# Patient Record
Sex: MALE | Race: BLACK OR AFRICAN AMERICAN | Employment: OTHER | URBAN - METROPOLITAN AREA
[De-identification: names, ages, dates, MRNs, and addresses within clinical notes are randomized per-mention and may not be internally consistent; named-entity substitution may affect disease eponyms.]

---

## 2020-04-26 ENCOUNTER — APPOINTMENT (OUTPATIENT)
Dept: GENERAL RADIOLOGY | Facility: CLINIC | Age: 40
End: 2020-04-26
Attending: EMERGENCY MEDICINE

## 2020-04-26 ENCOUNTER — HOSPITAL ENCOUNTER (EMERGENCY)
Facility: CLINIC | Age: 40
Discharge: HOME | End: 2020-04-26
Attending: EMERGENCY MEDICINE | Admitting: EMERGENCY MEDICINE

## 2020-04-26 VITALS
OXYGEN SATURATION: 98 % | RESPIRATION RATE: 18 BRPM | HEART RATE: 92 BPM | DIASTOLIC BLOOD PRESSURE: 97 MMHG | WEIGHT: 240.74 LBS | TEMPERATURE: 98.8 F | SYSTOLIC BLOOD PRESSURE: 153 MMHG

## 2020-04-26 DIAGNOSIS — M10.021 ACUTE IDIOPATHIC GOUT OF RIGHT ELBOW: Primary | ICD-10-CM

## 2020-04-26 DIAGNOSIS — M25.521 RIGHT ELBOW PAIN: ICD-10-CM

## 2020-04-26 PROCEDURE — 73070 X-RAY EXAM OF ELBOW: CPT | Mod: RT

## 2020-04-26 PROCEDURE — 25000131 ZZH RX MED GY IP 250 OP 636 PS 637: Performed by: EMERGENCY MEDICINE

## 2020-04-26 PROCEDURE — 99283 EMERGENCY DEPT VISIT LOW MDM: CPT

## 2020-04-26 PROCEDURE — 25000132 ZZH RX MED GY IP 250 OP 250 PS 637: Performed by: EMERGENCY MEDICINE

## 2020-04-26 RX ORDER — OLANZAPINE 10 MG/2ML
INJECTION, POWDER, FOR SOLUTION INTRAMUSCULAR
Status: DISCONTINUED
Start: 2020-04-26 | End: 2020-04-26 | Stop reason: HOSPADM

## 2020-04-26 RX ORDER — PREDNISONE 20 MG/1
TABLET ORAL
Qty: 10 TABLET | Refills: 0 | Status: SHIPPED | OUTPATIENT
Start: 2020-04-26 | End: 2020-06-12

## 2020-04-26 RX ORDER — PREDNISONE 20 MG/1
60 TABLET ORAL ONCE
Status: COMPLETED | OUTPATIENT
Start: 2020-04-26 | End: 2020-04-26

## 2020-04-26 RX ORDER — OXYCODONE AND ACETAMINOPHEN 5; 325 MG/1; MG/1
1-2 TABLET ORAL EVERY 4 HOURS PRN
Qty: 12 TABLET | Refills: 0 | Status: SHIPPED | OUTPATIENT
Start: 2020-04-26 | End: 2020-06-12

## 2020-04-26 RX ORDER — COLCHICINE 0.6 MG/1
0.6 TABLET ORAL 2 TIMES DAILY
Qty: 14 TABLET | Refills: 0 | Status: SHIPPED | OUTPATIENT
Start: 2020-04-26 | End: 2020-04-26

## 2020-04-26 RX ORDER — COLCHICINE 0.6 MG/1
1.2 TABLET ORAL ONCE
Status: DISCONTINUED | OUTPATIENT
Start: 2020-04-26 | End: 2020-04-26

## 2020-04-26 RX ORDER — IBUPROFEN 600 MG/1
600 TABLET, FILM COATED ORAL EVERY 6 HOURS PRN
Qty: 60 TABLET | Refills: 0 | Status: SHIPPED | OUTPATIENT
Start: 2020-04-26 | End: 2020-06-12

## 2020-04-26 RX ORDER — IBUPROFEN 600 MG/1
600 TABLET, FILM COATED ORAL ONCE
Status: COMPLETED | OUTPATIENT
Start: 2020-04-26 | End: 2020-04-26

## 2020-04-26 RX ADMIN — PREDNISONE 60 MG: 20 TABLET ORAL at 22:33

## 2020-04-26 RX ADMIN — IBUPROFEN 600 MG: 600 TABLET ORAL at 23:03

## 2020-04-26 ASSESSMENT — ENCOUNTER SYMPTOMS: FEVER: 0

## 2020-04-26 NOTE — ED AVS SNAPSHOT
[facility] Emergency Department  [address]  [facility] [address]  Phone:  [phone]  Fax:  [phone]                                    Tigist Pena   MRN: [id number]    Department:  [facility] Emergency Department   Date of Visit:  4/26/2020           After Visit Summary Signature Page    I have received my discharge instructions, and my questions have been answered. I have discussed any challenges I see with this plan with the nurse or doctor.    ..........................................................................................................................................  Patient/Patient Representative Signature      ..........................................................................................................................................  Patient Representative Print Name and Relationship to Patient    ..................................................               ................................................  Date                                   Time    ..........................................................................................................................................  Reviewed by Signature/Title    ...................................................              ..............................................  Date                                               Time          22EPIC Rev 08/18

## 2020-04-27 NOTE — DISCHARGE INSTRUCTIONS

## 2020-04-27 NOTE — ED PROVIDER NOTES
History   Chief Complaint:  Elbow Pain     HPI   Tigist Pena is a 39 year old male with history of gout who presents with right elbow pain. The patient reports 2 days of right sided elbow pain.  He denies fever or significant swelling to right elbow. He can flex and extend arm but is painful. He has a history of gout, and in the past has been treated with Colchicine, prednisone, and ibuprofen with good relief. He states that he recently moved from [place] where his gout was followed by Rheumatology, but he has not established care here. 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 He is worried about cost today as he does not have insurance.     Allergies:  No Known Drug Allergies    Medications:    Medications reviewed. No current medications.     Past Medical History:    Gout     Past Surgical History:    Right elbow surgery related to tophi    Family History:    Family history reviewed. No pertinent family history.     Social History:  Recently moved from [facility]    Review of Systems   Constitutional: Negative for fever.   Musculoskeletal:        Right elbow pain. No swelling.    All other systems reviewed and are negative.    Physical Exam     Patient Vitals for the past 24 hrs:   BP Temp Temp src Pulse Resp SpO2 Weight   04/26/20 2157 (!) 153/97 98.8  F (37.1  C) Oral 92 18 98 % 109.2 kg (240 lb 11.9 oz)       Physical Exam   General: Resting comfortably on the gurney  Head:  The scalp, face, and head appear normal  Eyes:  The pupils are normal    Conjunctivae and sclera appear normal  ENT:    The nose is normal    Ears/pinnae are normal  Neck:  Normal range of motion  CV:  RRR  Resp:  Clear to ascultation bilaterally.   Skin:  No rash or lesions noted.  Neuro: Speech is normal and fluent  Psych:  Awake. Alert.  Normal affect.      Appropriate interactions  MS:  Right Elbow:    Right arm held in flexion at 90 degrees at elbow.    The humerus is non-tender    The  olecranon is tender    The olecranon bursa is tender.     The lateral and medial supracondylar regions are non-tender    There is no obvious clinical effusion    There is pain with Pronation and Supination    There is pain with Flexion and Extension    The radial head and neck are non-tender    The proximal ulna is non-tender and there is no step off    Distal Hand Exam:    The finger flexors (FDS/FDP) are intact    The finger extensors are intact    The thumb exam is normal, including:    Adduction, abduction, flexion, extension, opposition    There are no sensory deficits    Median, Ulnar, and Radial nerve function is normal    Radial artery pulsations are normal    Capillary refill is normal    Emergency Department Course   Imaging:  Radiology findings were communicated with the patient who voiced understanding of the findings.    XR Elbow Right 2 Views  IMPRESSION: Normal joint spaces and alignment. No fracture or joint effusion.  Reading per radiology     Interventions:  2233 Prednisone 60 mg Oral  2303 Ibuprofen 600 mg oral    Emergency Department Course:  Nursing notes and vitals reviewed.    2205 I performed an exam of the patient as documented above.     The patient was sent for a XR Elbow Right while in the emergency department, results above.      2304 I rechecked the patient. Explained findings to the Patient.    Findings and plan explained to the Patient. Patient discharged home with instructions regarding supportive care, medications, and reasons to return. The importance of close follow-up was reviewed. The patient was prescribed Ibuprofen, Percocet, and Prednisone.      Impression & Plan    Medical Decision Making:  Tigist Pena is a 39 year old male who presents for evaluation of right elbow pain. There is minimal swelling.  This is consistent based on history with acute gouty attack, and Xray of the elbow demonstrates no acute fracture or dislocation.  Based on their history, elected to start  prednisone and ibuprofen for this acute gouty flare.  I doubt at this point that this is a septic arthritis, fracture, pseudogout, cellulitis, or other worrisome etiology.  Supportive outpatient management indicated, and the patient was given a prescription for a short course of percocet and ibuprofen.  Should see ortho in next 1-2 days for recheck and consideration of intra-articular joint injection if no improved symptoms with systemic steroids. Gout information given for home.  Referral to PCP and [facility] (orthopedic surgery)      Diagnosis:    ICD-10-CM    1. Acute idiopathic gout of right elbow  M10.021    2. Right elbow pain  M25.521        Disposition:   discharged to home    Discharge Medications:  Discharge Medication List as of 4/26/2020 11:14 PM      START taking these medications    Details   ibuprofen (ADVIL/MOTRIN) 600 MG tablet Take 1 tablet (600 mg) by mouth every 6 hours as needed for moderate pain or fever, Disp-60 tablet,R-0, Local Print      oxyCODONE-acetaminophen (PERCOCET) 5-325 MG tablet Take 1-2 tablets by mouth every 4 hours as needed for pain, Disp-12 tablet,R-0, Local Print      predniSONE (DELTASONE) 20 MG tablet Take two tablets (= 40mg) each day for 5 (five) days, Disp-10 tablet,R-0, Local Print             Scribe Disclosure:  I, Ashley Kathya, am serving as a scribe at 10:02 PM on 4/26/2020 to document services personally performed by Luis Antonio Blair MD based on my observations and the provider's statements to me.   [facility] EMERGENCY DEPARTMENT       Luis Antonio Blair MD  04/26/20 [id number]

## 2020-04-27 NOTE — ED TRIAGE NOTES
Patient presents with complaints of right elbow pain which started two days ago. Patient does have history of gout.. ABC intact without need for intervention at this time.

## 2020-05-05 ENCOUNTER — VIRTUAL VISIT (OUTPATIENT)
Dept: URGENT CARE | Facility: CLINIC | Age: 40
End: 2020-05-05

## 2020-05-05 DIAGNOSIS — M10.9 ACUTE GOUTY ARTHRITIS: Primary | ICD-10-CM

## 2020-05-05 PROCEDURE — 99207 ZZC NO BILLABLE SERVICE THIS VISIT: CPT | Mod: TEL | Performed by: STUDENT IN AN ORGANIZED HEALTH CARE EDUCATION/TRAINING PROGRAM

## 2020-05-05 RX ORDER — OMEPRAZOLE 40 MG/1
40 CAPSULE, DELAYED RELEASE ORAL DAILY
Qty: 30 CAPSULE | Refills: 1 | Status: SHIPPED | OUTPATIENT
Start: 2020-05-05 | End: 2020-06-12

## 2020-05-05 RX ORDER — PREDNISONE 10 MG/1
TABLET ORAL
Qty: 41 TABLET | Refills: 0 | Status: SHIPPED | OUTPATIENT
Start: 2020-05-05 | End: 2020-06-12

## 2020-05-05 RX ORDER — OXYCODONE HYDROCHLORIDE 5 MG/1
5 TABLET ORAL EVERY 6 HOURS PRN
Qty: 6 TABLET | Refills: 0 | Status: SHIPPED | OUTPATIENT
Start: 2020-05-05 | End: 2020-06-12

## 2020-05-05 RX ORDER — INDOMETHACIN 25 MG/1
25-50 CAPSULE ORAL 2 TIMES DAILY WITH MEALS
Qty: 30 CAPSULE | Refills: 0 | Status: SHIPPED | OUTPATIENT
Start: 2020-05-05 | End: 2020-06-12

## 2020-05-05 NOTE — PROGRESS NOTES
"Family Medicine Telephone Visit Note           Telephone Visit Consent   Patient was verbally read the following and verbal consent was obtained.    \"Telephone visits are billed at different rates depending on your insurance coverage. During this emergency period, for some insurers they may be billed the same as an in-person visit.  Please reach out to your insurance provider with any questions.  If during the course of the call the physician/provider feels a telephone visit is not appropriate, you will not be charged for this service.\"    Name person giving consent:  Patient   Date verbal consent given:  5/5/2020  Time verbal consent given:  9:07 AM      Chief Complaint   Patient presents with     Arthritis            HPI   Patients name: Tigist  Appointment start time:  9:07 AM    Tigist is a 38 yo male with a history of severe recurrent gout who is calling for another gout flare. He was seen in the ED on 4/26 for gout flare of his right elbow.  When in the ED it was noted elbow was minimally swollen and x-rays at that time were normal.  He was treated with a 5-day course of 40 mg of prednisone, ibuprofen, colchicine and a short course of Percocet.  Since the ED his elbow has not improved, and now he is having swelling in his ankle as well.  He was advised to follow-up with [facility] for therapeutic joint aspiration.  He finished his course of prednisone, and has been taking 800 mg of ibuprofen total daily.  He has not used the colchicine as this upsets his stomach.  He reports that he tried the Percocet but prefers oxycodone alone as it does not include the Tylenol.  He reports that he has had multiple gouty flares for the past 9 years, his last 2 years ago.  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  He has not been on allopurinol for preventative measures in between flares, but does note needing IV medications for pain control during " previous flares.  He was followed by rheumatology previously when he lived in [place] but recently moved up here so has not established with a rheumatologist or primary care doctor here.  He did try calling rheumatology at [place], and was offered an appointment at the end of May, but declined at this appointment as it was too far out for him.  He is looking into getting scheduled with rheumatology at [facility] instead.  He also tried calling [facility] and reports that they were hesitant to aspirate his knee for fear of infection at this time.      Current Outpatient Medications   Medication Sig Dispense Refill     ibuprofen (ADVIL/MOTRIN) 600 MG tablet Take 1 tablet (600 mg) by mouth every 6 hours as needed for moderate pain or fever 60 tablet 0     indomethacin (INDOCIN) 25 MG capsule Take 1-2 capsules (25-50 mg) by mouth 2 times daily (with meals) 30 capsule 0     omeprazole (PRILOSEC) 40 MG DR capsule Take 1 capsule (40 mg) by mouth daily 30 capsule 1     oxyCODONE-acetaminophen (PERCOCET) 5-325 MG tablet Take 1-2 tablets by mouth every 4 hours as needed for pain 12 tablet 0     predniSONE (DELTASONE) 10 MG tablet Take 6 tablets (60 mg) by mouth daily for 3 days, THEN 4 tablets (40 mg) daily for 3 days, THEN 2 tablets (20 mg) daily for 3 days, THEN 1 tablet (10 mg) daily for 3 days, THEN 0.5 tablets (5 mg) daily for 3 days. 41 tablet 0     predniSONE (DELTASONE) 20 MG tablet Take two tablets (= 40mg) each day for 5 (five) days 10 tablet 0     No Known Allergies           Review of Systems:     ROS COMP: Constitutional, HEENT, cardiovascular, pulmonary, gi and gu systems are negative, except as otherwise noted.         Physical Exam:     There were no vitals taken for this visit.  There is no height or weight on file to calculate BMI.    Exam:  Constitutional: healthy and alert  Psychiatric: mentation appears normal and frustrated    Results from last visit:  No results found for any previous visit.            Assessment and Plan   Tigist was seen today for arthritis.    Diagnoses and all orders for this visit:    Acute gouty arthritis  Patient has a long history of recurrent severe gout, but has not been on preventative treatments before.  He was frustrated with his care and requested urgent fluid removal from his swollen elbow and ankle.  The phone number to schedule with [facility] was provided to him as well as the phone number to establish with a primary care doctor in [place] as well.  He did become frustrated with his pain management when on the phone with him today.   was checked and did reveal the Percocet prescription from the ED, however we were unable to see any prescriptions from [place] due to [place] not being included in the .  We do not have records from his doctors in [place] and thus it is difficult to make a complete assessment and plan from this limited information.  I feel he should establish with a primary care physician for further assessment and management of his pain with coordination of rheumatology and orthopedic referrals.  In the meantime I did provide him with a prolonged prednisone course,  Indomethacin, and a short course of oxycodone, 6 tabs.  I also sent for omeprazole for gastric protection while he is on NSAIDs and steroids.  Advised to follow-up with a new primary doctor, as well as rheumatology when able.  -     indomethacin (INDOCIN) 25 MG capsule; Take 1-2 capsules (25-50 mg) by mouth 2 times daily (with meals)  -     predniSONE (DELTASONE) 10 MG tablet; Take 6 tablets (60 mg) by mouth daily for 3 days, THEN 4 tablets (40 mg) daily for 3 days, THEN 2 tablets (20 mg) daily for 3 days, THEN 1 tablet (10 mg) daily for 3 days, THEN 0.5 tablets (5 mg) daily for 3 days.  -     omeprazole (PRILOSEC) 40 MG DR capsule; Take 1 capsule (40 mg) by mouth daily  -     ORTHOPEDICS ADULT REFERRAL      Refilled medications that would be required in the next 3 months.     After Visit  Information:  Patient declined AVS     No follow-ups on file.    Appointment end time: 10:00 AM  This is a telephone visit that took 53 minutes.      Clinician location:  Virtual Urgent [facility]    Kayode Pryor MD  I precepted today with Dr. Chacko.

## 2020-06-12 ENCOUNTER — VIRTUAL VISIT (OUTPATIENT)
Dept: FAMILY MEDICINE | Facility: CLINIC | Age: 40
End: 2020-06-12

## 2020-06-12 DIAGNOSIS — M10.9 ACUTE GOUT OF RIGHT ANKLE, UNSPECIFIED CAUSE: Primary | ICD-10-CM

## 2020-06-12 DIAGNOSIS — M1A.0710 CHRONIC GOUT OF RIGHT ANKLE, UNSPECIFIED CAUSE: ICD-10-CM

## 2020-06-12 PROCEDURE — 99204 OFFICE O/P NEW MOD 45 MIN: CPT | Mod: 95 | Performed by: NURSE PRACTITIONER

## 2020-06-12 RX ORDER — PREDNISONE 20 MG/1
TABLET ORAL
Qty: 20 TABLET | Refills: 0 | Status: SHIPPED | OUTPATIENT
Start: 2020-06-12 | End: 2020-07-06

## 2020-06-12 RX ORDER — OXYCODONE HYDROCHLORIDE 5 MG/1
10 TABLET ORAL EVERY 6 HOURS PRN
Qty: 24 TABLET | Refills: 0 | Status: SHIPPED | OUTPATIENT
Start: 2020-06-12 | End: 2020-08-06

## 2020-06-12 RX ORDER — PROBENECID 500 MG/1
500 TABLET, FILM COATED ORAL 2 TIMES DAILY
Qty: 90 TABLET | Refills: 1 | Status: SHIPPED | OUTPATIENT
Start: 2020-06-12 | End: 2020-08-06

## 2020-06-12 NOTE — PROGRESS NOTES
"Tigist Pena is a 39 year old male who is being evaluated via a billable telephone visit.      The patient has been notified of following:     \"This telephone visit will be conducted via a call between you and your physician/provider. We have found that certain health care needs can be provided without the need for a physical exam.  This service lets us provide the care you need with a short phone conversation.  If a prescription is necessary we can send it directly to your pharmacy.  If lab work is needed we can place an order for that and you can then stop by our lab to have the test done at a later time.    Telephone visits are billed at different rates depending on your insurance coverage. During this emergency period, for some insurers they may be billed the same as an in-person visit.  Please reach out to your insurance provider with any questions.    If during the course of the call the physician/provider feels a telephone visit is not appropriate, you will not be charged for this service.\"    Patient has given verbal consent for Telephone visit?  Yes    What phone number would you like to be contacted at? [phone]    How would you like to obtain your AVS? no    Subjective     Tigist Pena is a 39 year old male who presents via phone visit today for the following health issues:    HPI  Gout  Duration: x 4/2020  Description   Location: ankle - right  Joint Swelling: YES  Redness: YES  Pain intensity:  severe  Accompanying signs and symptoms: None  History  Previous history of gout: YES   Trauma to the area: no   Precipitating factors:   Alcohol usage: none  Diuretic use: no   Recent illness: no   Therapies tried and outcome: None       HPI: Tigist calls today with the complaint of ongoing issues related to gout. He has chronic underlying gout that predominantly affects his elbow(s) and ankle(s). He states that he has been on urate-lowering therapy in the past and has actually taking IV infusion of a " medication under the supervision of his rheumatologist due to the severity of his issues. He recently moved to [facility] and had been without acute gout flare for a few years, but this April, he did experience another prompting him to present to the ED. He was referred to orthopedics and rheumatology and treated with prednisone and indomethacin. Due to COVID-19, he was not able to see ortho or rheumatology. He presented to the urgent care a week or so later reporting ongoing symptoms. A longer steroid course was ordered, in addition to more indomethacin and a PPI for gastric protection. He was given 6 tabs of oxycodone, as well (a repeated course of what was ordered in the ED a week earlier). Again, he was unable to schedule with [facility] or rheumatology, so he states that he has just sat and suffered with these symptoms for the past 6 weeks or so. He is wondering about next steps.     There is no problem list on file for this patient.    History reviewed. No pertinent surgical history.    Social History     Tobacco Use     Smoking status: Former Smoker     Smokeless tobacco: Never Used   Substance Use Topics     Alcohol use: Not on file     History reviewed. No pertinent family history.        Reviewed and updated as needed this visit by Provider  Allergies  Meds  Problems  Med Hx  Surg Hx  Fam Hx         Review of Systems   Constitutional, musculoskeletal, skin, and endocrine systems are negative, except as otherwise noted.       Objective   Reported vitals:  There were no vitals taken for this visit.   healthy, alert and no distress  PSYCH: Alert and oriented times 3; coherent speech, normal   rate and volume, able to articulate logical thoughts, able   to abstract reason, no tangential thoughts, no hallucinations   or delusions  His affect is normal and pleasant  RESP: No cough, no audible wheezing, able to talk in full sentences  Remainder of exam unable to be completed due to telephone visits    Diagnostic Test  Results:  Labs reviewed in Epic        Assessment/Plan:  1. Acute gout of right ankle, unspecified cause  Comment: We had a long discussion about next steps. Ultimately, we decided that he likely needs to see rheumatology given the intractable nature of this issue. He may need joint injection, surgery, or IV infusion (as he had before). In the meantime, I will give a 12-day burst and taper of prednisone and a 3-day supply of oxycodone 5 mg. No constitutional symptoms to suggest septic joint.   - predniSONE (DELTASONE) 20 MG tablet; Take 3 tabs by mouth daily x 3 days, then 2 tabs daily x 3 days, then 1 tab daily x 3 days, then 1/2 tab daily x 3 days.  Dispense: 20 tablet; Refill: 0  - oxyCODONE (ROXICODONE) 5 MG tablet; Take 2 tablets (10 mg) by mouth every 6 hours as needed for severe pain  Dispense: 24 tablet; Refill: 0    2. Chronic gout of right ankle, unspecified cause  Comment: Again, ideally, he will see rheumatology soon. If he can't, however, I will start urate-lowering therapy in the meantime. He will come to the clinic to have uric acid and renal function checked after this flare subsides, and he will then start Probenecid (his preference) if his renal function is appropriate for this. He will also use 250 mg bid Aleve to prevent flares as urate-lowering therapy is established.   - probenecid (BENEMID) 500 MG tablet; Take 1 tablet (500 mg) by mouth 2 times daily  Dispense: 90 tablet; Refill: 1  - Uric acid; Future  - Basic metabolic panel; Future  - RHEUMATOLOGY REFERRAL; Future    Return in about 4 weeks (around 7/10/2020) for persistent or worsening symptoms.      Phone call duration:  27 minutes    Ahsan Gonzales NP

## 2020-06-15 PROCEDURE — 99284 EMERGENCY DEPT VISIT MOD MDM: CPT | Mod: 25

## 2020-06-15 PROCEDURE — 96372 THER/PROPH/DIAG INJ SC/IM: CPT

## 2020-06-15 ASSESSMENT — MIFFLIN-ST. JEOR: SCORE: 2005.81

## 2020-06-16 ENCOUNTER — HOSPITAL ENCOUNTER (EMERGENCY)
Facility: CLINIC | Age: 40
Discharge: HOME | End: 2020-06-16
Attending: EMERGENCY MEDICINE | Admitting: EMERGENCY MEDICINE

## 2020-06-16 ENCOUNTER — VIRTUAL VISIT (OUTPATIENT)
Dept: FAMILY MEDICINE | Facility: CLINIC | Age: 40
End: 2020-06-16

## 2020-06-16 VITALS
RESPIRATION RATE: 16 BRPM | BODY MASS INDEX: 34.07 KG/M2 | SYSTOLIC BLOOD PRESSURE: 158 MMHG | DIASTOLIC BLOOD PRESSURE: 105 MMHG | TEMPERATURE: 98.7 F | HEART RATE: 91 BPM | WEIGHT: 238 LBS | OXYGEN SATURATION: 98 % | HEIGHT: 70 IN

## 2020-06-16 DIAGNOSIS — M10.9 ACUTE GOUTY ARTHRITIS: Primary | ICD-10-CM

## 2020-06-16 DIAGNOSIS — M79.671 RIGHT FOOT PAIN: ICD-10-CM

## 2020-06-16 PROCEDURE — 99214 OFFICE O/P EST MOD 30 MIN: CPT | Mod: 95 | Performed by: FAMILY MEDICINE

## 2020-06-16 PROCEDURE — 25000128 H RX IP 250 OP 636: Performed by: EMERGENCY MEDICINE

## 2020-06-16 PROCEDURE — 25000132 ZZH RX MED GY IP 250 OP 250 PS 637: Performed by: EMERGENCY MEDICINE

## 2020-06-16 RX ORDER — KETOROLAC TROMETHAMINE 30 MG/ML
30 INJECTION, SOLUTION INTRAMUSCULAR; INTRAVENOUS ONCE
Status: COMPLETED | OUTPATIENT
Start: 2020-06-16 | End: 2020-06-16

## 2020-06-16 RX ORDER — OXYCODONE HYDROCHLORIDE 5 MG/1
5 TABLET ORAL EVERY 6 HOURS PRN
Qty: 8 TABLET | Refills: 0 | Status: SHIPPED | OUTPATIENT
Start: 2020-06-16 | End: 2020-06-16

## 2020-06-16 RX ORDER — OXYCODONE HYDROCHLORIDE 5 MG/1
10 TABLET ORAL ONCE
Status: COMPLETED | OUTPATIENT
Start: 2020-06-16 | End: 2020-06-16

## 2020-06-16 RX ORDER — NABUMETONE 500 MG/1
500-1000 TABLET, FILM COATED ORAL 2 TIMES DAILY
Qty: 30 TABLET | Refills: 0 | Status: SHIPPED | OUTPATIENT
Start: 2020-06-16 | End: 2020-08-06

## 2020-06-16 RX ORDER — OXYCODONE HYDROCHLORIDE 5 MG/1
5 TABLET ORAL EVERY 6 HOURS PRN
Qty: 20 TABLET | Refills: 0 | Status: SHIPPED | OUTPATIENT
Start: 2020-06-16 | End: 2020-08-06

## 2020-06-16 RX ADMIN — KETOROLAC TROMETHAMINE 30 MG: 30 INJECTION, SOLUTION INTRAMUSCULAR at 01:25

## 2020-06-16 RX ADMIN — OXYCODONE HYDROCHLORIDE 10 MG: 5 TABLET ORAL at 01:24

## 2020-06-16 ASSESSMENT — ENCOUNTER SYMPTOMS
JOINT SWELLING: 1
CHILLS: 0
ARTHRALGIAS: 1
NUMBNESS: 0
FEVER: 0

## 2020-06-16 NOTE — ED TRIAGE NOTES
Here for right foot pain started x1 week ago and getting worse. Currently taking steroids and indomethacin. History of gout and does feel like his normal gout flare up. ABCs intact.

## 2020-06-16 NOTE — ED PROVIDER NOTES
"  History     Chief Complaint:  Foot Pain    The history is provided by the patient.      Tigist Pena is a 39 year old male with a history of pseudogout who presents with right foot pain.  Patient states that he is currently experiencing a \"gout flare.\"  He states that he had a virtual visit with his doctor on 6/12 and was prescribed oxycodone, probenecid as well as prednisone.  He states that he has been taking his oxycodone, 15 mg at a time \"as per his doctors recommendation\" and is continuing to have pain.  He denies any fever, chills, or redness to the area.  No reported trauma.  No other symptoms reported.  He states pain is exacerbated with movement though states this feels similar to previous gout flares.  He states that he also has a rheumatology referral but has yet to follow-up.  No history of IVDA.    Allergies:  No Known Allergies     Medications:    prednisone  Probenecid  oxycodone    Past Medical History:    Pseudogout    Past Surgical History:    The patient does not have any pertinent past surgical history.     Family History:    No past pertinent family history.     Social History:  Presents alone.   Tobacco use: former smoker  Alcohol use: negative   PCP: No Ref-Primary, Physician     Review of Systems   Constitutional: Negative for chills and fever.   Musculoskeletal: Positive for arthralgias and joint swelling.   Neurological: Negative for numbness.   All other systems reviewed and are negative.      Physical Exam     Patient Vitals for the past 24 hrs:   BP Temp Temp src Pulse Heart Rate Resp SpO2 Height Weight   06/16/20 0211 (!) 158/105 -- -- 91 -- -- -- -- --   06/16/20 0209 -- -- -- -- -- -- 98 % -- --   06/15/20 2340 (!) 179/108 98.7  F (37.1  C) Oral 110 110 16 99 % 1.778 m (5' 10\") 108 kg (238 lb)        Physical Exam   Nursing note and vitals reviewed.  Constitutional: Well nourished. Appears uncomfortable  Eyes: Conjunctiva normal.  Pupils are equal, round, and reactive to light. "   ENT: Nose normal. Mucous membranes pink and moist.    Neck: Normal range of motion.  CVS: Sinus tachycardia.  Normal heart sounds.  No murmur.  Pulmonary: Lungs clear to auscultation bilaterally. No wheezes/rales/rhonchi.  GI: Abdomen soft. Nontender, nondistended. No rigidity or guarding.    Neuro: Alert. Follows simple commands.  Skin: Skin is warm and dry. No rash noted.   Psychiatric: Normal affect.   Musculoskeletal: No peripheral edema or calf tenderness  R. ankle:  Inspection: No significant swelling/erythema  Palpation: TTP over the medial malleoli and R. forefoot  Strength: Able to flex/exend toes and DF/PF ankle though pain with passive ROM  Sensation: Intact to light touch in the dorsum/plantar aspects  Cap refil:   < 2 sec  DP/PT Pulse  Intact  Leg: No TTP over proximal fibula  R. knee: full flex/ext w/o pain, no ttp.  R. hip:  full flex/ext w/o pain, no ttp.    Emergency Department Course     Interventions:  0124 Oxycodone 10 mg PO  0125 Toradol 30 mg IM     Emergency Department Course:  Past medical records, nursing notes, and vitals reviewed.    0024 I performed an exam of the patient as documented above.     0201 Patient rechecked and updated.      I personally reviewed the care plan with the Patient and answered all related questions prior to discharge. I prescribed oxycodone.     Impression & Plan     Medical Decision Making:  Tigist Pena is a 39 year old male who presents to the emergency department today with nontraumatic R. Foot/ankle pain.  Patient is nontoxic appearing, neurovascularly intact on arrival.  He does appear however to be uncomfortable noted to be hypertensive and mildly tachycardic.  His vital signs improved after analgesia.  Review of [facility] shows oxycodone prescription, 24 tablets filled on 6/12/2020.  I did offer patient formal blood work as discussed cannot exclude possible septic joint though lower clinical suspicion.  He is declining this at this point in time  "stating \"I can't afford any testing,\" expressed understanding of missed or delayed diagnoses.  We also discussed arthrocentesis for diagnostic purposes though he is again declining this at this point in time.  He has capacity to make his own medical decisions.  Patient continues to state that he is confident this is his pseudogout.  I recommended he continue his prednisone as prescribed.  Recommend continuing anti-inflammatory NSAIDs and patient will be discharged home with 8 more tablets of oxycodone but I stated I do not feel comfortable filling more than that at this point in time.  He is instructed to return to the ED for fever, redness to the area or should symptoms worsen or change.  I also counseled patient on the importance of close rheumatology follow-up, referral which is already been provided.    Discharge Diagnosis:    ICD-10-CM    1. Right foot pain  M79.671      Disposition:  Discharged to home.    Discharge Medications:  New Prescriptions    OXYCODONE (ROXICODONE) 5 MG TABLET    Take 1 tablet (5 mg) by mouth every 6 hours as needed for pain     Scribe Disclosure:  I, Sol Rodriguez, am serving as a scribe on 6/16/2020 at 2:08 AM to personally document services performed by Jil Gómez DO based on my observations and the provider's statements to me.       Jil Gómez DO  06/16/20 0300    "

## 2020-06-16 NOTE — PROGRESS NOTES
"  Tigist Pena is a 39 year old male who is being evaluated via a billable telephone visit.      The patient has been notified of following:     \"This telephone visit will be conducted via a call between you and your physician/provider. We have found that certain health care needs can be provided without the need for a physical exam.  This service lets us provide the care you need with a short phone conversation.  If a prescription is necessary we can send it directly to your pharmacy.  If lab work is needed we can place an order for that and you can then stop by our lab to have the test done at a later time.    Telephone visits are billed at different rates depending on your insurance coverage. During this emergency period, for some insurers they may be billed the same as an in-person visit.  Please reach out to your insurance provider with any questions.    If during the course of the call the physician/provider feels a telephone visit is not appropriate, you will not be charged for this service.\"    Patient has given verbal consent for Telephone visit?  Yes    What phone number would you like to be contacted at? [phone]    How would you like to obtain your AVS? Mail a copy    Subjective     Tigist Pena is a 39 year old male who presents via phone visit today for the following health issues:    HPI  ED/UC Followup:    Facility:  [facility] [place]  Date of visit: 6/16/20  Reason for visit: Gout or infection in right ankle  Current Status: same         Has ongoing issue with recurrent gout last couple of months. And currently involve rt ankle and was seen at er but he did not want labs bc he was worried about cost/ insurance etc at the ER . He was given pain med's although he is running out and he is in too much so wants refill until he can see rheumatology, as he has been given referral. He is also willing to do labs through our clinic     Gout  Duration: has hx of gout, recurrent last 8 years   Description "   Location:  This time it is Rt ankle, mostly , but has had previously involved big toe, knee, fingers and elbow -   Joint Swelling: YES  Redness: YES, same not changed since er   Pain intensity:  moderate, severe  Accompanying signs and symptoms: no fever or chills,   History  Previous history of gout: YES   Trauma to the area: no   Precipitating factors:   Alcohol usage: none  Diuretic use: no   Recent illness: no   Therapies tried and outcome: prednisone , hydrocodone - was given and he is out        He was in [facility] and had seen rheumatology in the past many years ago and was diagnosed with gout .   He had first episode may be 8 years ago. Then he also had attack of pseudogout in elbow /   they drained the fluid from joint to test, so he was diagnosed with gout but also pseudogout .    There is no problem list on file for this patient.    No past surgical history on file.    Social History     Tobacco Use     Smoking status: Former Smoker     Smokeless tobacco: Never Used   Substance Use Topics     Alcohol use: Not on file     No family history on file.        Reviewed and updated as needed this visit by Provider         Review of Systems   Constitutional, HEENT, cardiovascular, pulmonary, GI, , musculoskeletal, neuro, skin, endocrine and psych systems are negative, except as otherwise noted.               Assessment/Plan:  (M10.9) Acute gouty arthritis  (primary encounter diagnosis)  Comment: rt ankle   Plan: **CBC with platelets FUTURE anytime, ESR:         Erythrocyte sedimentation rate, CRP,         inflammation, **Comprehensive metabolic panel         FUTURE anytime, Anti Nuclear Ibis IgG by IFA         with Reflex, nabumetone (RELAFEN) 500 MG         tablet, oxyCODONE (ROXICODONE) 5 MG tablet          Discussed possible differential diagnosis for his  Symptoms. Has know hx of gout and he feels like it is his classic attack, just wants refill on pain med's until he can see rheumatology.    Check labs.  refill sent.Cares and  treatment discussed.  follow up if problem   Patient expressed understanding and agreement with treatment plan. All patient's questions were answered, will let me know if has more later.  Medications: Rx's: Reviewed the potential side effects/complications of medications prescribed.       Phone call duration:  23  minutes    Sridevi Hanks MD

## 2020-06-16 NOTE — PATIENT INSTRUCTIONS
Take medications as directed.  Lab order placed.  Need to see rheumatology ASAP   Cares and symptomatic cares discussed   Follow up if problem or concern

## 2020-06-16 NOTE — ED AVS SNAPSHOT
[facility] Emergency Department  [address]  [facility] [address]  Phone:  [phone]  Fax:  [phone]                                    Tigist Pena   MRN: [id number]    Department:  [facility] Emergency Department   Date of Visit:  6/15/2020           After Visit Summary Signature Page    I have received my discharge instructions, and my questions have been answered. I have discussed any challenges I see with this plan with the nurse or doctor.    ..........................................................................................................................................  Patient/Patient Representative Signature      ..........................................................................................................................................  Patient Representative Print Name and Relationship to Patient    ..................................................               ................................................  Date                                   Time    ..........................................................................................................................................  Reviewed by Signature/Title    ...................................................              ..............................................  Date                                               Time          22EPIC Rev 08/18

## 2020-06-22 ENCOUNTER — VIRTUAL VISIT (OUTPATIENT)
Dept: FAMILY MEDICINE | Facility: CLINIC | Age: 40
End: 2020-06-22

## 2020-06-22 DIAGNOSIS — M10.9 ACUTE GOUT OF RIGHT ANKLE, UNSPECIFIED CAUSE: Primary | ICD-10-CM

## 2020-06-22 PROCEDURE — 99214 OFFICE O/P EST MOD 30 MIN: CPT | Mod: 95 | Performed by: INTERNAL MEDICINE

## 2020-06-22 RX ORDER — OXYCODONE HYDROCHLORIDE 15 MG/1
15 TABLET ORAL EVERY 6 HOURS PRN
Qty: 30 TABLET | Refills: 0 | Status: SHIPPED | OUTPATIENT
Start: 2020-06-22 | End: 2020-06-29

## 2020-06-22 RX ORDER — PREDNISONE 20 MG/1
TABLET ORAL
Qty: 35 TABLET | Refills: 0 | Status: SHIPPED | OUTPATIENT
Start: 2020-06-22 | End: 2020-07-06

## 2020-06-22 NOTE — PROGRESS NOTES
"Tigist Pena is a 39 year old male who is being evaluated via a billable telephone visit.      The patient has been notified of following:     \"This telephone visit will be conducted via a call between you and your physician/provider. We have found that certain health care needs can be provided without the need for a physical exam.  This service lets us provide the care you need with a short phone conversation.  If a prescription is necessary we can send it directly to your pharmacy.  If lab work is needed we can place an order for that and you can then stop by our lab to have the test done at a later time.    Telephone visits are billed at different rates depending on your insurance coverage. During this emergency period, for some insurers they may be billed the same as an in-person visit.  Please reach out to your insurance provider with any questions.    If during the course of the call the physician/provider feels a telephone visit is not appropriate, you will not be charged for this service.\"    Patient has given verbal consent for Telephone visit?  Yes    What phone number would you like to be contacted at? [phone]    How would you like to obtain your AVS? Mail a copy    Subjective     Tigist Pena is a 39 year old male who presents via phone visit today for the following health issues:    HPI  ED/UC Followup:    Facility:  [facility]  Date of visit: 6/16/20  Reason for visit: foot pain  Current Status: still extremely swollen     I spoke with Tigist today about his painful ankle.  He has a history of gout and pseudogout. Was previously seeing Rheum in [facility] and would get 15mg oxycodone and 80mg prednisone as needed for flares.     He has been dealing with this current flare for about a week and a half now.  His right ankle is very very painful.  He did a virtual visit with Phil Gonzales on 6/12, was prescribed oxycodone and prednisone burst and taper.  The prednisone helped just slightly.  He was given " a referral to rheum, but their soonest available is in August.  Was taking oxycodone 15mg QID with good relief.     He went to the ED on 6/16 for continued symptoms and was given referral to ortho and short supply of pain med. Although the note says the patient declined joint aspiration, he reports they said they couldn't do it in the ED.      His ankle is still swollen and very painful, not any better and maybe slightly worse than in the ED.  He is out of oxycodone and on the last days of prednisone. He isn't taking NSAIDs since he was advised in the past not to take this with prednisone. Called [facility] but they require a large amount of money down since he doesn't have insurance.      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  But due to COVID was laid off.  Has an application for insurance through the state, but that is taking awhile to get processed.           Reviewed and updated as needed this visit by Provider         Review of Systems   Const, msk reviewed,  otherwise negative unless noted above.         Objective   Reported vitals:  There were no vitals taken for this visit.   healthy, alert and mild distress  PSYCH: Alert and oriented times 3; coherent speech, normal   rate and volume, able to articulate logical thoughts, able   to abstract reason, no tangential thoughts, no hallucinations   or delusions  His affect is normal  RESP: No cough, no audible wheezing, able to talk in full sentences  Remainder of exam unable to be completed due to telephone visits    Diagnostic Test Results:  None         Assessment/Plan:  1. Acute gout of right ankle, unspecified cause  Kory is feeling like this is more of a pseudogout flare since the usual treatments for gout aren't working. Ideally he could get the joint drained and possibly steroid injection.  I will give him the benefit of the doubt and prescribe the 80mg of prednisone (also apparently has used  metformin as needed with hyperglycemia related to prednisone) and 15mg of oxycodone.  He will call to make ortho appt. Recommended pepcid or prilosec for stomach protection.   - [facility]  Referral; Future  - oxyCODONE (ROXICODONE) 15 MG tablet; Take 1 tablet (15 mg) by mouth every 6 hours as needed for severe pain  Dispense: 30 tablet; Refill: 0  - predniSONE (DELTASONE) 20 MG tablet; Take 4 tablets (80 mg) by mouth daily for 4 days, THEN 3 tablets (60 mg) daily for 3 days, THEN 2 tablets (40 mg) daily for 3 days, THEN 1 tablet (20 mg) daily for 3 days, THEN 0.5 tablets (10 mg) daily for 2 days.  Dispense: 35 tablet; Refill: 0    No follow-ups on file.      Phone call duration:  21 minutes     Mary Gutierrez MD

## 2020-06-29 ENCOUNTER — TELEPHONE (OUTPATIENT)
Dept: FAMILY MEDICINE | Facility: CLINIC | Age: 40
End: 2020-06-29

## 2020-06-29 ENCOUNTER — VIRTUAL VISIT (OUTPATIENT)
Dept: FAMILY MEDICINE | Facility: CLINIC | Age: 40
End: 2020-06-29

## 2020-06-29 DIAGNOSIS — M10.9 ACUTE GOUT OF RIGHT ANKLE, UNSPECIFIED CAUSE: ICD-10-CM

## 2020-06-29 PROCEDURE — 99214 OFFICE O/P EST MOD 30 MIN: CPT | Mod: 95 | Performed by: INTERNAL MEDICINE

## 2020-06-29 RX ORDER — OXYCODONE HYDROCHLORIDE 15 MG/1
15 TABLET ORAL EVERY 6 HOURS PRN
Qty: 30 TABLET | Refills: 0 | Status: SHIPPED | OUTPATIENT
Start: 2020-06-29 | End: 2020-07-06

## 2020-06-29 RX ORDER — INDOMETHACIN 50 MG/1
50 CAPSULE ORAL 2 TIMES DAILY WITH MEALS
COMMUNITY

## 2020-06-29 NOTE — TELEPHONE ENCOUNTER
Routing to provider for review.  Appointment notes not complete.    KIMBERLY LeeN, RN  [employer] Triage

## 2020-06-29 NOTE — TELEPHONE ENCOUNTER
Reason for Call:  Other prescription    Detailed comments: Pt is returning Dr. Gutierrez's phone call. Note is in the appointment notes from today on appointment desk.    Pt is wondering why their pain medication was not sent in. Pt states they are in a lot of pain.    Phone Number Patient can be reached at: Cell number on file:    Telephone Information:   Mobile [phone]       Best Time: any    Can we leave a detailed message on this number? YES    Call taken on 6/29/2020 at 4:45 PM by Eula Moody

## 2020-06-29 NOTE — TELEPHONE ENCOUNTER
S/w pt who states he was in the bathroom when Dr. Gutierrez attempted to call him today.  States he is in a lot of pain and requesting oxy stating Dr. Gutierrez was going to refill it.    Advised Dr. Gutierrez is gone for the evening and will call him tomorrow.  Advised can try calling her and pt appreciates it.    S/w Dr. Gutierrez who states she will refill the oxycodone and has appt scheduled for ortho on Monday 7/6 at 9:40 and to let pt know.  Advised pt of Dr. Gutierrez's reply and pt states understanding.    Brooklyn YE RN  EP Triage

## 2020-06-29 NOTE — PROGRESS NOTES
"Tigist Pena is a 39 year old male who is being evaluated via a billable telephone visit.      The patient has been notified of following:     \"This telephone visit will be conducted via a call between you and your physician/provider. We have found that certain health care needs can be provided without the need for a physical exam.  This service lets us provide the care you need with a short phone conversation.  If a prescription is necessary we can send it directly to your pharmacy.  If lab work is needed we can place an order for that and you can then stop by our lab to have the test done at a later time.    Telephone visits are billed at different rates depending on your insurance coverage. During this emergency period, for some insurers they may be billed the same as an in-person visit.  Please reach out to your insurance provider with any questions.    If during the course of the call the physician/provider feels a telephone visit is not appropriate, you will not be charged for this service.\"    Patient has given verbal consent for Telephone visit?  Yes    What phone number would you like to be contacted at? [phone]    How would you like to obtain your AVS? Mail a copy    Subjective     Tigist Pena is a 39 year old male who presents via phone visit today for the following health issues:    HPI  Gout/ single inflamed joint   Onset: 2 days ago     Description:   Location: fingers and hand - right  Joint Swelling: YES  Redness: YES  Pain: YES    Intensity: moderate    Progression of Symptoms:  worsening    Accompanying Signs & Symptoms:  Fevers: no     History:   Trauma to the area: no   Previous history of gout: YES   Recent illness:  no     Precipitating factors:   Diet-rich in purine: no  Alcohol use: no   Diuretic use: no     Alleviating factors:      I spoke with Tigist a week ago for a gout flare in his right ankle.  We did a week of oxycodone and high dose prednisone.  He was supposed to make an " appt with ortho for joint aspiration and injection, but either couldn't make an appt due to insurance or was told they wouldn't treat acute gout (?).   His ankle is better than what it was, but still swollen.  Still needing to use crutches. Now also developed a flare in his right wrist and hand - it is all swollen and very painful.  Was talking to Rheum when I first called and they told him they should be able to get him in in two weeks.  He is out of oxycodone.  Taking NSAIDs which are now making him a little nauseous. He used to get Krystexxa at his previous rheumatologist which was very effective.      He's had some knee pain here and there. 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           Reviewed and updated as needed this visit by Provider         Review of Systems   Const, msk reviewed,  otherwise negative unless noted above.         Objective   Reported vitals:  There were no vitals taken for this visit.   healthy, alert and mild distress  PSYCH: Alert and oriented times 3; coherent speech, normal   rate and volume, able to articulate logical thoughts, able   to abstract reason, no tangential thoughts, no hallucinations   or delusions  His affect is normal  RESP: No cough, no audible wheezing, able to talk in full sentences  Remainder of exam unable to be completed due to telephone visits    Diagnostic Test Results:  None         Assessment/Plan:  1. Acute gout of right ankle, unspecified cause  I called [facility], the earliest they could get him in for joint aspiration and injection is in one week, so I did make that appt for him.  Will refill oxycodone to get to that appt.  Will continue with the prednisone taper from last week, since he has been on a significant total dose so far and it hasn't really been that effective.   - oxyCODONE IR (ROXICODONE) 15 MG tablet; Take 1 tablet (15 mg) by mouth every 6 hours as needed for severe pain  Dispense: 30  tablet; Refill: 0    Return in about 1 week (around 7/6/2020) for ortho .      Phone call duration:  15 minutes         Mary Gutierrez MD

## 2020-06-30 PROBLEM — M10.9 ACUTE GOUTY ARTHRITIS: Status: ACTIVE | Noted: 2020-06-30

## 2020-07-06 ENCOUNTER — VIRTUAL VISIT (OUTPATIENT)
Dept: FAMILY MEDICINE | Facility: CLINIC | Age: 40
End: 2020-07-06

## 2020-07-06 DIAGNOSIS — M10.9 ACUTE GOUT OF RIGHT ANKLE, UNSPECIFIED CAUSE: ICD-10-CM

## 2020-07-06 PROCEDURE — 99213 OFFICE O/P EST LOW 20 MIN: CPT | Mod: 95 | Performed by: INTERNAL MEDICINE

## 2020-07-06 RX ORDER — OXYCODONE HYDROCHLORIDE 15 MG/1
15 TABLET ORAL EVERY 6 HOURS PRN
Qty: 14 TABLET | Refills: 0 | Status: SHIPPED | OUTPATIENT
Start: 2020-07-06 | End: 2020-07-10

## 2020-07-06 SDOH — HEALTH STABILITY: MENTAL HEALTH: HOW OFTEN DO YOU HAVE A DRINK CONTAINING ALCOHOL?: NEVER

## 2020-07-06 NOTE — PROGRESS NOTES
"Tigist Pena is a 39 year old male who is being evaluated via a billable telephone visit.      The patient has been notified of following:     \"This telephone visit will be conducted via a call between you and your physician/provider. We have found that certain health care needs can be provided without the need for a physical exam.  This service lets us provide the care you need with a short phone conversation.  If a prescription is necessary we can send it directly to your pharmacy.  If lab work is needed we can place an order for that and you can then stop by our lab to have the test done at a later time.    Telephone visits are billed at different rates depending on your insurance coverage. During this emergency period, for some insurers they may be billed the same as an in-person visit.  Please reach out to your insurance provider with any questions.    If during the course of the call the physician/provider feels a telephone visit is not appropriate, you will not be charged for this service.\"    Patient has given verbal consent for Telephone visit?  Yes    What phone number would you like to be contacted at? [phone]    How would you like to obtain your AVS? Mail a copy    Subjective     Tigist Pena is a 39 year old male who presents via phone visit today for the following health issues:    HPI     Gout/ single inflamed joint   Onset: x 2 month    Description:   Location: fingers and hand - right  Joint Swelling: YES-but has improved  Redness: no   Pain: YES    Intensity: 7/10-currently; 10/10-worst    Progression of Symptoms:  Improving but still painful    Accompanying Signs & Symptoms:  Fevers: no     History:   Trauma to the area: no   Previous history of gout: YES   Recent illness:  no     Precipitating factors:   Diet-rich in purine: no  Alcohol use: no   Diuretic use: no     -patient states has apt with rheum this Friday      Therapies Tried and outcome: prednisone, oxycodone    Tigist soler " "calling for an update on his pain.  He didn't go to his orthopedic appt this morning (today is Monday) because he was able to get an appt with a rheumatologist on Friday with Dr. Sanchez.  The swelling in his hand/wrist and ankle is down a lot. The pain is still there, but better.  He took his last dose of oxycodone this morning. Taking indomethacin.  Done with prednisone.  His pain was about 8/10 this morning before taking oxycodone, went down to 5-6/10 after taking oxycodone and indomethacin.     The rheumatologist will not be prescribing pain management. Advised that he contact me for a taper.  Tigist wants to \"just be safe\" and have enough pain medications so he doesn't have to call back and doesn't have withdrawal.         Reviewed and updated as needed this visit by Provider         Review of Systems   msk reviewed,  otherwise negative unless noted above.         Objective   Reported vitals:  There were no vitals taken for this visit.   healthy, alert and no distress  PSYCH: Alert and oriented times 3; coherent speech, normal   rate and volume, able to articulate logical thoughts, able   to abstract reason, no tangential thoughts, no hallucinations   or delusions  His affect is normal  RESP: No cough, no audible wheezing, able to talk in full sentences  Remainder of exam unable to be completed due to telephone visits            Assessment/Plan:  1. Acute gout of right ankle, unspecified cause  He is improving with his acute gout/pseudogout and will be seeing rheumatology in 4 days with plans for a local steroid injection.  He has been on prn oxycodone for about 3-4 weeks now. I believe he is low risk for withdrawal. Stressed that the safest prescription is the least amount possible.  Recommended he limit oxycodone to BID over the remainder of the week, if needed, and then stop.    - oxyCODONE IR (ROXICODONE) 15 MG tablet; Take 1 tablet (15 mg) by mouth every 6 hours as needed for severe pain Max 2 per day  " Dispense: 14 tablet; Refill: 0    Return in about 1 week (around 7/13/2020) for If no improvement.      Phone call duration:  14 minutes    Mary Gutierrez MD

## 2020-07-10 ENCOUNTER — VIRTUAL VISIT (OUTPATIENT)
Dept: FAMILY MEDICINE | Facility: CLINIC | Age: 40
End: 2020-07-10

## 2020-07-10 DIAGNOSIS — M10.9 ACUTE GOUT OF RIGHT ANKLE, UNSPECIFIED CAUSE: ICD-10-CM

## 2020-07-10 PROCEDURE — 99213 OFFICE O/P EST LOW 20 MIN: CPT | Mod: 95 | Performed by: INTERNAL MEDICINE

## 2020-07-10 RX ORDER — OXYCODONE HYDROCHLORIDE 15 MG/1
7.5-15 TABLET ORAL EVERY 6 HOURS PRN
Qty: 6 TABLET | Refills: 0 | Status: SHIPPED | OUTPATIENT
Start: 2020-07-10 | End: 2020-08-06

## 2020-07-10 NOTE — PROGRESS NOTES
"Tigist Pnea is a 39 year old male who is being evaluated via a billable telephone visit.      The patient has been notified of following:     \"This telephone visit will be conducted via a call between you and your physician/provider. We have found that certain health care needs can be provided without the need for a physical exam.  This service lets us provide the care you need with a short phone conversation.  If a prescription is necessary we can send it directly to your pharmacy.  If lab work is needed we can place an order for that and you can then stop by our lab to have the test done at a later time.    Telephone visits are billed at different rates depending on your insurance coverage. During this emergency period, for some insurers they may be billed the same as an in-person visit.  Please reach out to your insurance provider with any questions.    If during the course of the call the physician/provider feels a telephone visit is not appropriate, you will not be charged for this service.\"    Patient has given verbal consent for Telephone visit?  Yes    What phone number would you like to be contacted at? [phone]    How would you like to obtain your AVS? Mail a copy    Subjective     Tigist Pena is a 39 year old male who presents via phone visit today for the following health issues:    HPI  Gout/ single inflamed joint   Onset: 2 months    Description:   Location: foot - right  Joint Swelling: YES  Redness: YES  Pain: YES    Intensity: moderate    Progression of Symptoms:  No Change    Accompanying Signs & Symptoms:  Fevers: no     History:   Trauma to the area: no   Previous history of gout: YES   Recent illness:  no     Therapies Tried and outcome: none      Today is Friday. Tigist and I last spoke 4 days ago, on Monday.  At that time he was feeling improved, I wrote a rx for one week's worth of oxycodone 15mg to take BID.      He was supposed to see rheumatology earlier today. But that was a " "phone visit, so now he is scheduled at 4:15 for an inperson visit.  He is actually out of oxycodone.  Apparently thought a \"week\" meant Monday - Friday.  He is taking 1 or 1/2 tablet every 6 hours.  Not taking tylenol, thought he couldn't take in addition to indomethacin.  The swelling is better, but still very painful. His wrist is mostly recovered, but not the ankle. There is a dark area on the skin where the pain is on his ankle.  States he is using crutches and thinks he is getting a bed sore on his hip because he has only been laying in one position.           Reviewed and updated as needed this visit by Provider         Review of Systems   Const, msk reviewed,  otherwise negative unless noted above.         Objective   Reported vitals:  There were no vitals taken for this visit.   healthy, alert and mild distress  PSYCH: Alert and oriented times 3; coherent speech, normal   rate and volume, able to articulate logical thoughts, able   to abstract reason, no tangential thoughts, no hallucinations   or delusions  His affect is normal  RESP: No cough, no audible wheezing, able to talk in full sentences  Remainder of exam unable to be completed due to telephone visits            Assessment/Plan:  1. Acute gout of right ankle, unspecified cause  He needs to take acetaminophen 1000mg TID scheduled along with the indomethacin. 6 tablets to get through the weekend. No further refills. Try 1/2 tablet.  He is supposedly seeing rheum later today.  They have afternoon appts but the office  hours close at noon, so I was not able to get ahold of Dr. Warren.  I will call him Monday.   - oxyCODONE IR (ROXICODONE) 15 MG tablet; Take 0.5-1 tablets (7.5-15 mg) by mouth every 6 hours as needed for severe pain Max 2 tabs per day  Dispense: 6 tablet; Refill: 0        Phone call duration:  14 minutes    Mary Gutierrez MD      "

## 2020-07-27 ENCOUNTER — VIRTUAL VISIT (OUTPATIENT)
Dept: FAMILY MEDICINE | Facility: CLINIC | Age: 40
End: 2020-07-27

## 2020-07-27 DIAGNOSIS — M10.9 ACUTE GOUTY ARTHRITIS: Primary | ICD-10-CM

## 2020-07-27 DIAGNOSIS — Z76.5 DRUG-SEEKING BEHAVIOR: ICD-10-CM

## 2020-07-27 PROCEDURE — 99213 OFFICE O/P EST LOW 20 MIN: CPT | Mod: 95 | Performed by: PHYSICIAN ASSISTANT

## 2020-07-27 RX ORDER — IBUPROFEN 800 MG/1
800 TABLET, FILM COATED ORAL EVERY 8 HOURS PRN
COMMUNITY

## 2020-07-27 NOTE — PROGRESS NOTES
"Tigist ePna is a 39 year old male who is being evaluated via a billable telephone visit.      The patient has been notified of following:     \"This telephone visit will be conducted via a call between you and your physician/provider. We have found that certain health care needs can be provided without the need for a physical exam.  This service lets us provide the care you need with a short phone conversation.  If a prescription is necessary we can send it directly to your pharmacy.  If lab work is needed we can place an order for that and you can then stop by our lab to have the test done at a later time.    Telephone visits are billed at different rates depending on your insurance coverage. During this emergency period, for some insurers they may be billed the same as an in-person visit.  Please reach out to your insurance provider with any questions.    If during the course of the call the physician/provider feels a telephone visit is not appropriate, you will not be charged for this service.\"    Patient has given verbal consent for Telephone visit?  Yes    What phone number would you like to be contacted at? [phone]    How would you like to obtain your AVS? Mail a copy    Subjective     Tigist Pena is a 39 year old male who presents via phone visit today for the following health issues:    HPI    Concern - follow up on gout issues  Onset: ongoing    Description:   Follow up per pt on gout issues ongoing problems    Intensity: mild    Progression of Symptoms:  same    Accompanying Signs & Symptoms:      Previous history of similar problem:       Precipitating factors:   Worsened by:     Alleviating factors:  Improved by:     Therapies Tried and outcome:       Tigist is a 38 y/o male patient with hx of gout affecting his right elbow and right ankle.  He has had several virtual visits with providers in out clinic over the past month for pain control but due to lack of health insurance he has not been " able to follow up in the clinic for labs or further evaluation by a specialist.  Initially he was seen in the ER  4/2020  for gout and was prescribed prednisone and percocet.  He has been on a few courses of prednisone ( most recently was 6/22/2020) and tells me that this has caused him to have diabetes and so he had not been taking it. He has tried colchicine in the past without relief and developed SE of diarrhea.  He is currently taking ibuprofen 800 mg every 8 hours. Most recently he had a virtual visit on 7 /10/2020 and was prescribed a small amount of oxycodone and advised to take Tylenol 1000 mg TID schedule in addition to the ibuprofen.  He was also advised that he needed to follow up with rheumatology.  He tells me that he has tried to schedule several appointments with rheumatology but this has been difficult due to lack of insurance. He is currently working with a  to obtain health insurance.  He reports that the pain in his right elbow is currently 10/10 despite ibuprofen.  He is once again requesting a refill of oxycodone.           Patient Active Problem List   Diagnosis     Acute gouty arthritis     Past Surgical History:   Procedure Laterality Date     NO HISTORY OF SURGERY         Social History     Tobacco Use     Smoking status: Former Smoker     Smokeless tobacco: Never Used   Substance Use Topics     Alcohol use: Not Currently     Frequency: Never     No family history on file.      Current Outpatient Medications   Medication Sig Dispense Refill     ibuprofen (ADVIL/MOTRIN) 800 MG tablet Take 800 mg by mouth every 8 hours as needed for moderate pain       indomethacin (INDOCIN) 50 MG capsule Take 50 mg by mouth 2 times daily (with meals)       nabumetone (RELAFEN) 500 MG tablet Take 1-2 tablets (500-1,000 mg) by mouth 2 times daily (Patient not taking: Reported on 7/6/2020) 30 tablet 0     oxyCODONE (ROXICODONE) 5 MG tablet Take 1 tablet (5 mg) by mouth every 6 hours as needed for  pain (Patient not taking: Reported on 7/6/2020) 20 tablet 0     oxyCODONE IR (ROXICODONE) 15 MG tablet Take 0.5-1 tablets (7.5-15 mg) by mouth every 6 hours as needed for severe pain Max 2 tabs per day (Patient not taking: Reported on 7/27/2020) 6 tablet 0     probenecid (BENEMID) 500 MG tablet Take 1 tablet (500 mg) by mouth 2 times daily (Patient not taking: Reported on 7/6/2020) 90 tablet 1     No Known Allergies    Reviewed and updated as needed this visit by Provider         Review of Systems   Constitutional, HEENT, cardiovascular, pulmonary, GI, , musculoskeletal, neuro, skin, endocrine and psych systems are negative, except as otherwise noted.       Objective   Reported vitals:  There were no vitals taken for this visit.   healthy, alert and no distress  PSYCH: Alert and oriented times 3; coherent speech, normal   rate and volume, able to articulate logical thoughts, able   to abstract reason, no tangential thoughts, no hallucinations   or delusions  His affect is normal  RESP: No cough, no audible wheezing, able to talk in full sentences  Remainder of exam unable to be completed due to telephone visits    Diagnostic Test Results:  none         Assessment/Plan:      1. Acute gouty arthritis  Offered to restart prednisone taper today to help with acute gout, he declines this and requests oxycodone only.  I advised that narcotic are not appropriate for long term treatment of gout and that he should have in person evaluation either in our office or with rheumatology if possible for labs and may be considered for steroid injection given the significance of his pain.  and he became upset and hung up the phone.     2. Drug-seeking behavior      No follow-ups on file.      Phone call duration:  15 minutes        Maurice Marques PA-C

## 2020-08-06 ENCOUNTER — OFFICE VISIT (OUTPATIENT)
Dept: INTERNAL MEDICINE | Facility: CLINIC | Age: 40
End: 2020-08-06

## 2020-08-06 VITALS
WEIGHT: 254.8 LBS | OXYGEN SATURATION: 99 % | HEIGHT: 70 IN | SYSTOLIC BLOOD PRESSURE: 138 MMHG | DIASTOLIC BLOOD PRESSURE: 80 MMHG | TEMPERATURE: 99 F | RESPIRATION RATE: 15 BRPM | HEART RATE: 88 BPM | BODY MASS INDEX: 36.48 KG/M2

## 2020-08-06 DIAGNOSIS — M10.9 ACUTE GOUTY ARTHRITIS: Primary | ICD-10-CM

## 2020-08-06 PROCEDURE — 99214 OFFICE O/P EST MOD 30 MIN: CPT | Performed by: INTERNAL MEDICINE

## 2020-08-06 RX ORDER — METHYLPREDNISOLONE 4 MG
TABLET, DOSE PACK ORAL
Qty: 21 TABLET | Refills: 0 | Status: SHIPPED | OUTPATIENT
Start: 2020-08-06

## 2020-08-06 RX ORDER — OXYCODONE HYDROCHLORIDE 5 MG/1
5 TABLET ORAL EVERY 6 HOURS PRN
Qty: 20 TABLET | Refills: 0 | Status: SHIPPED | OUTPATIENT
Start: 2020-08-06

## 2020-08-06 ASSESSMENT — PAIN SCALES - GENERAL: PAINLEVEL: EXTREME PAIN (8)

## 2020-08-06 ASSESSMENT — MIFFLIN-ST. JEOR: SCORE: 2082.02

## 2020-08-06 NOTE — PROGRESS NOTES
Subjective     Tigist Pena is a 39 year old male who presents to clinic today for the following health issues:    HPI     New Patient to me.    Patient states that he has been dealing with gout for many years.  He states he was told he may have a combination of gout and pseudogout in the past has tried allopurinol as well as probenecid neither of which she is currently taking.  He has been seen 6 separate times since June of this year virtual visits by the [facility] for his gout.  He had orders placed in the computer but has not obtained them.  He states that he has had insurance coverage issues.    He now comes to the clinic today stating that he is at primarily issues in his right elbow but also has had problems in his right knee and ankle.  He has been prescribed supplemental pain medication in addition to his recent treatment.  He has been treated with anti-inflammatories which give him a sub-optimal response and he was also been treated with colchicine which apparently he developed loose stools.    He states he has had multiple aspirations of his joints in the past with confirmation of such.    Gout  Duration: on and off since April   Description   Location: elbow - right  Joint Swelling: YES  Redness: no   Pain intensity:  severe  Accompanying signs and symptoms: None  History  Previous history of gout: YES   Trauma to the area: no   Precipitating factors:   Alcohol usage: none  Diuretic use: no   Recent illness: no   Therapies tried and outcome: Prednisone, Oxycodone, Indomethacin, ibuprofen -short term relief. Patient states he is unable to schedule with rheumatology due to lack of insurance coverage. Currently working with  to obtain medical assistance      Patient Active Problem List   Diagnosis     Acute gouty arthritis     Past Surgical History:   Procedure Laterality Date     NO HISTORY OF SURGERY         Social History     Tobacco Use     Smoking status: Former Smoker      "Smokeless tobacco: Never Used   Substance Use Topics     Alcohol use: Not Currently     Frequency: Never     No family history on file.      Current Outpatient Medications   Medication Sig Dispense Refill     ibuprofen (ADVIL/MOTRIN) 800 MG tablet Take 800 mg by mouth every 8 hours as needed for moderate pain       indomethacin (INDOCIN) 50 MG capsule Take 50 mg by mouth 2 times daily (with meals)       nabumetone (RELAFEN) 500 MG tablet Take 1-2 tablets (500-1,000 mg) by mouth 2 times daily (Patient not taking: Reported on 7/6/2020) 30 tablet 0     oxyCODONE IR (ROXICODONE) 15 MG tablet Take 0.5-1 tablets (7.5-15 mg) by mouth every 6 hours as needed for severe pain Max 2 tabs per day (Patient not taking: Reported on 7/27/2020) 6 tablet 0     probenecid (BENEMID) 500 MG tablet Take 1 tablet (500 mg) by mouth 2 times daily (Patient not taking: Reported on 7/6/2020) 90 tablet 1     No Known Allergies  BP Readings from Last 3 Encounters:   08/06/20 138/80   06/16/20 (!) 158/105   04/26/20 (!) 153/97    Wt Readings from Last 3 Encounters:   06/15/20 108 kg (238 lb)   04/26/20 109.2 kg (240 lb 11.9 oz)           Reviewed and updated as needed this visit by Provider         Review of Systems   CONSTITUTIONAL: NEGATIVE for fever, chills, change in weight  ENT/MOUTH: NEGATIVE for ear, mouth and throat problems  RESP: NEGATIVE for significant cough or SOB  CV: NEGATIVE for chest pain, palpitations or peripheral edema  GI: NEGATIVE for nausea, abdominal pain, heartburn, or change in bowel habits  : NEGATIVE for frequency, dysuria, or hematuria  NEURO: NEGATIVE for weakness, dizziness or paresthesias  HEME: NEGATIVE for bleeding problems  PSYCHIATRIC: NEGATIVE for changes in mood or affect      Objective    /80   Pulse 88   Temp 99  F (37.2  C) (Temporal)   Resp 15   Ht 1.778 m (5' 10\")   Wt 115.6 kg (254 lb 12.8 oz)   SpO2 99%   BMI 36.56 kg/m    Body mass index is 36.56 kg/m .  Physical Exam   GENERAL: Alert " "holding his right elbow.  obese.  MS: The right elbow reveals tenderness at the joint space with reduced range of motion.  No cellulitic changes noted  SKIN: no suspicious lesions or rashes  NEURO: Normal strength and tone, mentation intact and speech normal  PSYCH: mentation appears normal, affect normal/bright        Assessment & Plan     1. Acute gouty arthritis  We will start with a Medrol Dosepak and the patient was given a short supply of pain medicine also.  Please note that the patient was somewhat upset with me that I would not give him more pain medications as he states he requires 15 mg tablets.  I informed him that I am uncomfortable having just met him prescribing narcotics to that degree and that he will have to use the 5 mg tablets accordingly.    Presumed secondary to acute gouty exacerbation.  Discussed with patient I am reluctant to start any probenecid or allopurinol during his acute exacerbation and that based on his longstanding history he really needs to be seen by rheumatologist.  He was advised also that he needs to get those labs done that were previously ordered for him.  - methylPREDNISolone (MEDROL DOSEPAK) 4 MG tablet therapy pack; Follow Package Directions  Dispense: 21 tablet; Refill: 0  - RHEUMATOLOGY REFERRAL; Future, I have placed the referral with a different rheumatologist as he has had trouble getting in due to insurance issues.  - oxyCODONE (ROXICODONE) 5 MG tablet; Take 1 tablet (5 mg) by mouth every 6 hours as needed for pain  Dispense: 20 tablet; Refill: 0     BMI:   Estimated body mass index is 34.15 kg/m  as calculated from the following:    Height as of 6/15/20: 1.778 m (5' 10\").    Weight as of 6/15/20: 108 kg (238 lb).   Weight management plan: Discussed healthy diet and exercise guidelines        See Patient Instructions    Return for Lab Work appointment, follow-up [facility].    Mathew Cleaning MD  [facility]  "

## 2020-08-07 ENCOUNTER — VIRTUAL VISIT (OUTPATIENT)
Dept: INTERNAL MEDICINE | Facility: CLINIC | Age: 40
End: 2020-08-07

## 2020-08-07 DIAGNOSIS — M25.529 ELBOW PAIN, UNSPECIFIED LATERALITY: Primary | ICD-10-CM

## 2020-08-07 NOTE — PROGRESS NOTES
"Tigist Pena is a 39 year old male who is being evaluated via a billable telephone visit.      The patient has been notified of following:     \"This telephone visit will be conducted via a call between you and your physician/provider. We have found that certain health care needs can be provided without the need for a physical exam.  This service lets us provide the care you need with a short phone conversation.  If a prescription is necessary we can send it directly to your pharmacy.  If lab work is needed we can place an order for that and you can then stop by our lab to have the test done at a later time.    Telephone visits are billed at different rates depending on your insurance coverage. During this emergency period, for some insurers they may be billed the same as an in-person visit.  Please reach out to your insurance provider with any questions.    If during the course of the call the physician/provider feels a telephone visit is not appropriate, you will not be charged for this service.\"    Patient has given verbal consent for Telephone visit?  Yes    What phone number would you like to be contacted at? [phone]    How would you like to obtain your AVS? Mail a copy    Subjective     Tigist Pena is a 39 year old male who presents via phone visit today for the following health issues:    HPI    Follow up on gout  Seen yesterday by Dr. Cleaning. Given medrol dosepak and oxycodone.   Called today stating he needed 10 mg oxycodone not the 5 mg tablets.         Reviewed and updated as needed this visit by Provider         Review of Systems          Objective   Reported vitals:  There were no vitals taken for this visit.   alert and no distress  PSYCH: Alert and oriented times 3; coherent speech, normal   rate and volume, able to articulate logical thoughts, able   to abstract reason, no tangential thoughts, no hallucinations   or delusions  His affect is normal  RESP: No cough, no audible wheezing, able to " talk in full sentences  Remainder of exam unable to be completed due to telephone visits            Assessment/Plan:  Gout  Frequent use of narcotics    - until yesterday had 8 virtual visits ,  No office visits.   -would be atypical /rare to use narcotics for gout/psuedogout - wouldn't recommend this as part of any regimen for acute attacks.    -- defer any rx to Dr. Cleaning   -no on request to change rx     There are no diagnoses linked to this encounter.    No follow-ups on file.      Phone call duration:  6 minutes    Ahsan Hilton MD

## 2020-08-13 ENCOUNTER — TELEPHONE (OUTPATIENT)
Dept: INTERNAL MEDICINE | Facility: CLINIC | Age: 40
End: 2020-08-13

## 2020-08-13 NOTE — TELEPHONE ENCOUNTER
Spoke with and then attempted to contact pt to see if he prefers : left message  1. To discuss with MD his joint pain knowing that MD will not be prescribing any narcotics/pain medications  2. If he would like to be set up with [facility] for joint pain /possible injection

## 2020-08-13 NOTE — TELEPHONE ENCOUNTER
Noted. If nurse able to reach pt at time of appt and pt wishes to have appt with me, I will be happy to conduct appt as medically appropriate